# Patient Record
Sex: MALE | Race: WHITE | NOT HISPANIC OR LATINO | Employment: FULL TIME | URBAN - METROPOLITAN AREA
[De-identification: names, ages, dates, MRNs, and addresses within clinical notes are randomized per-mention and may not be internally consistent; named-entity substitution may affect disease eponyms.]

---

## 2019-01-19 PROCEDURE — 99284 EMERGENCY DEPT VISIT MOD MDM: CPT | Mod: 25 | Performed by: EMERGENCY MEDICINE

## 2019-01-19 PROCEDURE — 81001 URINALYSIS AUTO W/SCOPE: CPT | Performed by: EMERGENCY MEDICINE

## 2019-01-19 PROCEDURE — 76705 ECHO EXAM OF ABDOMEN: CPT | Performed by: EMERGENCY MEDICINE

## 2019-01-19 PROCEDURE — 76705 ECHO EXAM OF ABDOMEN: CPT | Mod: 26 | Performed by: EMERGENCY MEDICINE

## 2019-01-20 ENCOUNTER — HOSPITAL ENCOUNTER (EMERGENCY)
Facility: CLINIC | Age: 49
Discharge: HOME | End: 2019-01-20
Attending: EMERGENCY MEDICINE | Admitting: EMERGENCY MEDICINE
Payer: COMMERCIAL

## 2019-01-20 VITALS
DIASTOLIC BLOOD PRESSURE: 95 MMHG | WEIGHT: 230 LBS | OXYGEN SATURATION: 96 % | SYSTOLIC BLOOD PRESSURE: 145 MMHG | RESPIRATION RATE: 16 BRPM | HEART RATE: 64 BPM

## 2019-01-20 DIAGNOSIS — R10.9 FLANK PAIN: ICD-10-CM

## 2019-01-20 DIAGNOSIS — N20.1 URETEROLITHIASIS: ICD-10-CM

## 2019-01-20 LAB
ALBUMIN SERPL-MCNC: 4.3 G/DL (ref 3.4–5)
ALBUMIN UR-MCNC: 30 MG/DL
ALP SERPL-CCNC: 91 U/L (ref 40–150)
ALT SERPL W P-5'-P-CCNC: 36 U/L (ref 0–70)
ANION GAP SERPL CALCULATED.3IONS-SCNC: 10 MMOL/L (ref 3–14)
APPEARANCE UR: ABNORMAL
AST SERPL W P-5'-P-CCNC: 26 U/L (ref 0–45)
BASOPHILS # BLD AUTO: 0.1 10E9/L (ref 0–0.2)
BASOPHILS NFR BLD AUTO: 0.4 %
BILIRUB SERPL-MCNC: 0.4 MG/DL (ref 0.2–1.3)
BILIRUB UR QL STRIP: NEGATIVE
BUN SERPL-MCNC: 15 MG/DL (ref 7–30)
CALCIUM SERPL-MCNC: 8.8 MG/DL (ref 8.5–10.1)
CHLORIDE SERPL-SCNC: 106 MMOL/L (ref 94–109)
CO2 SERPL-SCNC: 25 MMOL/L (ref 20–32)
COLOR UR AUTO: YELLOW
CREAT SERPL-MCNC: 1.03 MG/DL (ref 0.66–1.25)
DIFFERENTIAL METHOD BLD: ABNORMAL
EOSINOPHIL # BLD AUTO: 0.1 10E9/L (ref 0–0.7)
EOSINOPHIL NFR BLD AUTO: 0.4 %
ERYTHROCYTE [DISTWIDTH] IN BLOOD BY AUTOMATED COUNT: 12.2 % (ref 10–15)
GFR SERPL CREATININE-BSD FRML MDRD: 86 ML/MIN/{1.73_M2}
GLUCOSE SERPL-MCNC: 144 MG/DL (ref 70–99)
GLUCOSE UR STRIP-MCNC: NEGATIVE MG/DL
HCT VFR BLD AUTO: 43.4 % (ref 40–53)
HGB BLD-MCNC: 15.5 G/DL (ref 13.3–17.7)
HGB UR QL STRIP: ABNORMAL
IMM GRANULOCYTES # BLD: 0.1 10E9/L (ref 0–0.4)
IMM GRANULOCYTES NFR BLD: 0.5 %
KETONES UR STRIP-MCNC: 5 MG/DL
LEUKOCYTE ESTERASE UR QL STRIP: NEGATIVE
LYMPHOCYTES # BLD AUTO: 1 10E9/L (ref 0.8–5.3)
LYMPHOCYTES NFR BLD AUTO: 7.2 %
MCH RBC QN AUTO: 30.8 PG (ref 26.5–33)
MCHC RBC AUTO-ENTMCNC: 35.7 G/DL (ref 31.5–36.5)
MCV RBC AUTO: 86 FL (ref 78–100)
MONOCYTES # BLD AUTO: 0.4 10E9/L (ref 0–1.3)
MONOCYTES NFR BLD AUTO: 2.9 %
MUCOUS THREADS #/AREA URNS LPF: PRESENT /LPF
NEUTROPHILS # BLD AUTO: 12.3 10E9/L (ref 1.6–8.3)
NEUTROPHILS NFR BLD AUTO: 88.6 %
NITRATE UR QL: NEGATIVE
NRBC # BLD AUTO: 0 10*3/UL
NRBC BLD AUTO-RTO: 0 /100
PH UR STRIP: 6 PH (ref 5–7)
PLATELET # BLD AUTO: 252 10E9/L (ref 150–450)
POTASSIUM SERPL-SCNC: 3.6 MMOL/L (ref 3.4–5.3)
PROT SERPL-MCNC: 7.8 G/DL (ref 6.8–8.8)
RBC # BLD AUTO: 5.04 10E12/L (ref 4.4–5.9)
RBC #/AREA URNS AUTO: 73 /HPF (ref 0–2)
SODIUM SERPL-SCNC: 141 MMOL/L (ref 133–144)
SOURCE: ABNORMAL
SP GR UR STRIP: 1.03 (ref 1–1.03)
SQUAMOUS #/AREA URNS AUTO: <1 /HPF (ref 0–1)
UROBILINOGEN UR STRIP-MCNC: 2 MG/DL (ref 0–2)
WBC # BLD AUTO: 13.9 10E9/L (ref 4–11)
WBC #/AREA URNS AUTO: 2 /HPF (ref 0–5)

## 2019-01-20 PROCEDURE — 80053 COMPREHEN METABOLIC PANEL: CPT | Performed by: EMERGENCY MEDICINE

## 2019-01-20 PROCEDURE — 96361 HYDRATE IV INFUSION ADD-ON: CPT | Performed by: EMERGENCY MEDICINE

## 2019-01-20 PROCEDURE — 85025 COMPLETE CBC W/AUTO DIFF WBC: CPT | Performed by: EMERGENCY MEDICINE

## 2019-01-20 PROCEDURE — 25000128 H RX IP 250 OP 636: Performed by: EMERGENCY MEDICINE

## 2019-01-20 PROCEDURE — 96374 THER/PROPH/DIAG INJ IV PUSH: CPT | Performed by: EMERGENCY MEDICINE

## 2019-01-20 RX ORDER — IBUPROFEN 200 MG
600-800 TABLET ORAL EVERY 8 HOURS PRN
Qty: 60 TABLET | Refills: 0 | COMMUNITY
Start: 2019-01-20 | End: 2019-01-25

## 2019-01-20 RX ORDER — ONDANSETRON 4 MG/1
4 TABLET, ORALLY DISINTEGRATING ORAL EVERY 8 HOURS PRN
Status: DISCONTINUED | OUTPATIENT
Start: 2019-01-20 | End: 2019-01-20 | Stop reason: HOSPADM

## 2019-01-20 RX ORDER — ACETAMINOPHEN 500 MG
1000 TABLET ORAL EVERY 8 HOURS PRN
Qty: 30 TABLET | Refills: 0 | COMMUNITY
Start: 2019-01-20 | End: 2019-01-25

## 2019-01-20 RX ORDER — SODIUM CHLORIDE 9 MG/ML
1000 INJECTION, SOLUTION INTRAVENOUS CONTINUOUS
Status: DISCONTINUED | OUTPATIENT
Start: 2019-01-20 | End: 2019-01-20 | Stop reason: HOSPADM

## 2019-01-20 RX ORDER — KETOROLAC TROMETHAMINE 30 MG/ML
30 INJECTION, SOLUTION INTRAMUSCULAR; INTRAVENOUS ONCE
Status: COMPLETED | OUTPATIENT
Start: 2019-01-20 | End: 2019-01-20

## 2019-01-20 RX ORDER — ONDANSETRON 4 MG/1
4 TABLET, ORALLY DISINTEGRATING ORAL EVERY 8 HOURS PRN
Qty: 10 TABLET | Refills: 0 | Status: SHIPPED | OUTPATIENT
Start: 2019-01-20 | End: 2019-01-23

## 2019-01-20 RX ADMIN — KETOROLAC TROMETHAMINE 30 MG: 30 INJECTION, SOLUTION INTRAMUSCULAR at 00:51

## 2019-01-20 RX ADMIN — SODIUM CHLORIDE 1000 ML: 9 INJECTION, SOLUTION INTRAVENOUS at 00:51

## 2019-01-20 ASSESSMENT — ENCOUNTER SYMPTOMS
DYSURIA: 0
NAUSEA: 1
WEAKNESS: 0
CHEST TIGHTNESS: 0
SHORTNESS OF BREATH: 0
BACK PAIN: 1
FLANK PAIN: 1
APPETITE CHANGE: 0
VOMITING: 0
ABDOMINAL PAIN: 1
FEVER: 0
NUMBNESS: 0

## 2019-01-20 NOTE — ED AVS SNAPSHOT
[facility] Emergency Department  [address] [address]  Phone:  [phone]  Fax:  [phone]                                    Darnell Linder   MRN: [id number]    Department:  [facility] Emergency Department   Date of Visit:  1/19/2019           After Visit Summary Signature Page    I have received my discharge instructions, and my questions have been answered. I have discussed any challenges I see with this plan with the nurse or doctor.    ..........................................................................................................................................  Patient/Patient Representative Signature      ..........................................................................................................................................  Patient Representative Print Name and Relationship to Patient    ..................................................               ................................................  Date                                   Time    ..........................................................................................................................................  Reviewed by Signature/Title    ...................................................              ..............................................  Date                                               Time          22EPIC Rev 08/18

## 2019-01-20 NOTE — ED PROVIDER NOTES
History     Chief Complaint   Patient presents with     Flank Pain     bilateral, pain radiates to lower abd, pain with urination, hx of kidney stones     HPI  Darnell Linder is a 48 year old male with a history of kidney stones in the past presenting for evaluation of bilateral flank pain now radiating down to his suprapubic area.  Symptoms began abruptly around 6 PM last night.  Symptoms rapidly progressed from flank pain to suprapubic pain and pressure.  Denies dysuria, urgency, or frequency.  Symptoms reported to be similar to previous kidney stones he experienced however symptoms have progressed much more quickly.  He does report his last kidney stone passed spontaneously without intervention.  Denies any fever or chills.  Denies any testicular pain.  Reports some associated nausea but no vomiting.  No medications taken prior to ED visit.    Allergies:  Not on File    Problem List:    There are no active problems to display for this patient.       Past Medical History:    No past medical history on file.    Past Surgical History:    No past surgical history on file.    Family History:    No family history on file.    Social History:  Marital Status:   [2]  Social History     Tobacco Use     Smoking status: Not on file   Substance Use Topics     Alcohol use: Not on file     Drug use: Not on file        Medications:      acetaminophen (TYLENOL) 500 MG tablet   ibuprofen (ADVIL/MOTRIN) 200 MG tablet   ondansetron (ZOFRAN ODT) 4 MG ODT tab         Review of Systems   Constitutional: Negative for appetite change and fever.   HENT: Negative for congestion.    Respiratory: Negative for chest tightness and shortness of breath.    Cardiovascular: Negative for chest pain.   Gastrointestinal: Positive for abdominal pain and nausea. Negative for vomiting.   Genitourinary: Positive for flank pain. Negative for decreased urine volume, dysuria and urgency.   Musculoskeletal: Positive for back pain.   Skin: Negative for  rash.   Neurological: Negative for weakness and numbness.   All other systems reviewed and are negative.      Physical Exam   BP: (!) 148/94  Pulse: 56  Heart Rate: 75  Resp: 18  Weight: 104.3 kg (230 lb)  SpO2: 97 %      Physical Exam   Constitutional: He is oriented to person, place, and time. He appears well-developed and well-nourished. No distress.   HENT:   Head: Normocephalic and atraumatic.   Eyes: Conjunctivae are normal.   Cardiovascular: Normal rate and regular rhythm.   Pulmonary/Chest: Effort normal. No respiratory distress.   Abdominal: Soft. There is tenderness (mild suprapubic). There is no rebound, no guarding and no CVA tenderness.   Neurological: He is alert and oriented to person, place, and time.   Skin: Skin is warm and dry. Capillary refill takes less than 2 seconds.   Psychiatric: He has a normal mood and affect.   Nursing note and vitals reviewed.      ED Course        Procedures        Results for orders placed or performed during the hospital encounter of 01/20/19 (from the past 24 hour(s))   UA with Microscopic   Result Value Ref Range    Color Urine Yellow     Appearance Urine Slightly Cloudy     Glucose Urine Negative NEG^Negative mg/dL    Bilirubin Urine Negative NEG^Negative    Ketones Urine 5 (A) NEG^Negative mg/dL    Specific Gravity Urine 1.032 1.003 - 1.035    Blood Urine Moderate (A) NEG^Negative    pH Urine 6.0 5.0 - 7.0 pH    Protein Albumin Urine 30 (A) NEG^Negative mg/dL    Urobilinogen mg/dL 2.0 0.0 - 2.0 mg/dL    Nitrite Urine Negative NEG^Negative    Leukocyte Esterase Urine Negative NEG^Negative    Source Midstream Urine     WBC Urine 2 0 - 5 /HPF    RBC Urine 73 (H) 0 - 2 /HPF    Squamous Epithelial /HPF Urine <1 0 - 1 /HPF    Mucous Urine Present (A) NEG^Negative /LPF   CBC with platelets differential   Result Value Ref Range    WBC 13.9 (H) 4.0 - 11.0 10e9/L    RBC Count 5.04 4.4 - 5.9 10e12/L    Hemoglobin 15.5 13.3 - 17.7 g/dL    Hematocrit 43.4 40.0 - 53.0 %    MCV  86 78 - 100 fl    MCH 30.8 26.5 - 33.0 pg    MCHC 35.7 31.5 - 36.5 g/dL    RDW 12.2 10.0 - 15.0 %    Platelet Count 252 150 - 450 10e9/L    Diff Method Automated Method     % Neutrophils 88.6 %    % Lymphocytes 7.2 %    % Monocytes 2.9 %    % Eosinophils 0.4 %    % Basophils 0.4 %    % Immature Granulocytes 0.5 %    Nucleated RBCs 0 0 /100    Absolute Neutrophil 12.3 (H) 1.6 - 8.3 10e9/L    Absolute Lymphocytes 1.0 0.8 - 5.3 10e9/L    Absolute Monocytes 0.4 0.0 - 1.3 10e9/L    Absolute Eosinophils 0.1 0.0 - 0.7 10e9/L    Absolute Basophils 0.1 0.0 - 0.2 10e9/L    Abs Immature Granulocytes 0.1 0 - 0.4 10e9/L    Absolute Nucleated RBC 0.0    Comprehensive metabolic panel   Result Value Ref Range    Sodium 141 133 - 144 mmol/L    Potassium 3.6 3.4 - 5.3 mmol/L    Chloride 106 94 - 109 mmol/L    Carbon Dioxide 25 20 - 32 mmol/L    Anion Gap 10 3 - 14 mmol/L    Glucose 144 (H) 70 - 99 mg/dL    Urea Nitrogen 15 7 - 30 mg/dL    Creatinine 1.03 0.66 - 1.25 mg/dL    GFR Estimate 86 >60 mL/min/[1.73_m2]    GFR Estimate If Black >90 >60 mL/min/[1.73_m2]    Calcium 8.8 8.5 - 10.1 mg/dL    Bilirubin Total 0.4 0.2 - 1.3 mg/dL    Albumin 4.3 3.4 - 5.0 g/dL    Protein Total 7.8 6.8 - 8.8 g/dL    Alkaline Phosphatase 91 40 - 150 U/L    ALT 36 0 - 70 U/L    AST 26 0 - 45 U/L   POC US ABDOMEN LIMITED    [facility] Procedure Note    Limited Bedside ED Renal Ultrasound:    PERFORMED BY: Dr. Scott Evans Espinoza  INDICATIONS:  Abdominal Pain/flank  PROBE: Low frequency convex probe  BODY LOCATION:  Abdomen  FINDINGS:  The ultrasound was performed with longitudinal and transverse views.   Right Kidney:   Hydronephrosis:  None   }  Left Kidney:   Hydronephrosis:  None     INTERPRETATION:  The evaluation of the kidneys was normal without evidence of hydronephrosis  IMAGE DOCUMENTATION: Images were archived to PACs system.           Medications   0.9% sodium chloride BOLUS (0 mLs Intravenous Stopped 1/20/19 8540)    ketorolac (TORADOL) injection 30 mg (30 mg Intravenous Given 1/20/19 0051)     2:00 AM: Patient reassessed.  Pain remains controlled.  Plan for discharge home and urology follow-up if symptoms not improving.      Assessments & Plan (with Medical Decision Making)  48-year-old male with previous history of kidney stones presenting for evaluation of pain rating to his lower abdomen and now primarily with suprapubic abdominal pain.  Symptoms similar to previous kidney stones.  Previous single kidney stone passed without intervention.  Bedside ultrasound did not show any obvious hydronephrosis but his urine sample did show blood.  Given he has previously been confirmed to have a diagnosis of kidney stones and his symptoms are similar to previous stones that he has hematuria on his urinalysis, symptoms most consistent with ureterolithiasis.  Symptomatically improved with Toradol.  Discussed that a CT could be performed for definitive diagnosis however is clinically consistent with ureterolithiasis and his symptoms are now adequately controlled.  Patient and myself are comfortable with symptomatic treatment with presumed expected management of ureterolithiasis.  Recommended follow-up with urology in 1 week if symptoms not resolved.  Symptoms dramatically worsen or uncontrolled with medications at home, return to the ED for repeat evaluation.  Encourage treatment with 800 mg of ibuprofen alternating with 1000 mg of acetaminophen every 4 hours as needed for pain control.  Discussed opiates for pain control as well however, patient has had severe nausea and vomiting with opiates in the past so patient prefers to avoid these.  Also discussed treatment with Flomax.  Given his previous stones have passed and evidence is markedly inconclusive regarding the benefit of Flomax, especially for small stones, recommended against its use at this time.     I have reviewed the nursing notes.    I have reviewed the findings, diagnosis,  plan and need for follow up with the patient.          Medication List      Started    acetaminophen 500 MG tablet  Commonly known as:  TYLENOL  1,000 mg, Oral, EVERY 8 HOURS PRN     ibuprofen 200 MG tablet  Commonly known as:  ADVIL/MOTRIN  600-800 mg, Oral, EVERY 8 HOURS PRN     ondansetron 4 MG ODT tab  Commonly known as:  ZOFRAN ODT  4 mg, Oral, EVERY 8 HOURS PRN            Final diagnoses:   Flank pain   Ureterolithiasis       1/19/2019   [facility] EMERGENCY DEPARTMENT     Espinoza, Scott Evans MD  01/20/19 6250

## 2019-01-20 NOTE — ED TRIAGE NOTES
"Sudden onset low back/flank pain that progressed to wrap around into groin on both sides approx 2000hrs  Has had urge to urinate with small results since some nausea when pain spikes no vomiting   Had kidney stone about 5yrs ago feels similar  Did not take any OTCs at home    Reports strong pain meds make him sick per wife \"he puked for days last time he had some\"  "

## 2019-07-25 ENCOUNTER — RECORDS - HEALTHEAST (OUTPATIENT)
Dept: LAB | Facility: CLINIC | Age: 49
End: 2019-07-25

## 2019-07-25 LAB
ALBUMIN SERPL-MCNC: 4.4 G/DL (ref 3.5–5)
ALP SERPL-CCNC: 74 U/L (ref 45–120)
ALT SERPL W P-5'-P-CCNC: 28 U/L (ref 0–45)
ANION GAP SERPL CALCULATED.3IONS-SCNC: 10 MMOL/L (ref 5–18)
AST SERPL W P-5'-P-CCNC: 24 U/L (ref 0–40)
BILIRUB SERPL-MCNC: 0.5 MG/DL (ref 0–1)
BUN SERPL-MCNC: 20 MG/DL (ref 8–22)
CALCIUM SERPL-MCNC: 10.3 MG/DL (ref 8.5–10.5)
CHLORIDE BLD-SCNC: 105 MMOL/L (ref 98–107)
CHOLEST SERPL-MCNC: 180 MG/DL
CO2 SERPL-SCNC: 25 MMOL/L (ref 22–31)
CREAT SERPL-MCNC: 0.93 MG/DL (ref 0.7–1.3)
FASTING STATUS PATIENT QL REPORTED: NORMAL
GFR SERPL CREATININE-BSD FRML MDRD: >60 ML/MIN/1.73M2
GLUCOSE BLD-MCNC: 112 MG/DL (ref 70–125)
HDLC SERPL-MCNC: 60 MG/DL
LDLC SERPL CALC-MCNC: 102 MG/DL
POTASSIUM BLD-SCNC: 4.1 MMOL/L (ref 3.5–5)
PROT SERPL-MCNC: 7.2 G/DL (ref 6–8)
SODIUM SERPL-SCNC: 140 MMOL/L (ref 136–145)
TRIGL SERPL-MCNC: 88 MG/DL

## 2019-11-14 ENCOUNTER — ANESTHESIA - HEALTHEAST (OUTPATIENT)
Dept: SURGERY | Facility: CLINIC | Age: 49
End: 2019-11-14

## 2019-11-14 ENCOUNTER — SURGERY - HEALTHEAST (OUTPATIENT)
Dept: SURGERY | Facility: CLINIC | Age: 49
End: 2019-11-14

## 2019-11-14 ASSESSMENT — MIFFLIN-ST. JEOR: SCORE: 1888.23

## 2021-04-25 ENCOUNTER — HEALTH MAINTENANCE LETTER (OUTPATIENT)
Age: 51
End: 2021-04-25

## 2021-04-30 ENCOUNTER — IMMUNIZATION (OUTPATIENT)
Dept: FAMILY MEDICINE | Facility: CLINIC | Age: 51
End: 2021-04-30
Payer: COMMERCIAL

## 2021-04-30 PROCEDURE — 91301 PR COVID VAC MODERNA 100 MCG/0.5 ML IM: CPT

## 2021-04-30 PROCEDURE — 0011A PR COVID VAC MODERNA 100 MCG/0.5 ML IM: CPT

## 2021-05-28 ENCOUNTER — IMMUNIZATION (OUTPATIENT)
Dept: FAMILY MEDICINE | Facility: CLINIC | Age: 51
End: 2021-05-28
Attending: FAMILY MEDICINE
Payer: COMMERCIAL

## 2021-05-28 PROCEDURE — 91301 PR COVID VAC MODERNA 100 MCG/0.5 ML IM: CPT

## 2021-05-28 PROCEDURE — 0012A PR COVID VAC MODERNA 100 MCG/0.5 ML IM: CPT

## 2021-06-03 NOTE — ANESTHESIA CARE TRANSFER NOTE
Last vitals:   Vitals:    11/14/19 1540   BP: 137/84   Pulse: 82   Resp: 18   Temp: 35.9  C (96.6  F)   SpO2: 90%     Patient's level of consciousness is drowsy  Spontaneous respirations: yes  Maintains airway independently: yes  Dentition unchanged: yes  Oropharynx: oropharynx clear of all foreign objects    QCDR Measures:  ASA# 20 - Surgical Safety Checklist: WHO surgical safety checklist completed prior to induction    PQRS# 430 - Adult PONV Prevention: 4558F - Pt received => 2 anti-emetic agents (different classes) preop & intraop  ASA# 8 - Peds PONV Prevention: NA - Not pediatric patient, not GA or 2 or more risk factors NOT present  PQRS# 424 - Brianda-op Temp Management: 4559F - At least one body temp DOCUMENTED => 35.5C or 95.9F within required timeframe  PQRS# 426 - PACU Transfer Protocol: - Transfer of care checklist used  ASA# 14 - Acute Post-op Pain: ASA14B - Patient did NOT experience pain >= 7 out of 10

## 2021-06-03 NOTE — ANESTHESIA POSTPROCEDURE EVALUATION
Patient: Darnell CROSS Long  NASAL VALVE RECONSTRUCTION, BILATERAL, SUBMUCOUS RESECTION OF INFERIOR TURBINATES, SEPTOPLASTY, BIOPSY LEFT MIDDLE TURBINATE  Anesthesia type: general    Patient location: PACU  Last vitals:   Vitals Value Taken Time   /81 11/14/2019  5:00 PM   Temp 36.2  C (97.2  F) 11/14/2019  4:00 PM   Pulse 79 11/14/2019  5:09 PM   Resp 20 11/14/2019  4:30 PM   SpO2 96 % 11/14/2019  5:09 PM   Vitals shown include unvalidated device data.  Post vital signs: stable  Level of consciousness: awake and responds to simple questions  Post-anesthesia pain: pain controlled  Post-anesthesia nausea and vomiting: no  Pulmonary: unassisted, return to baseline  Cardiovascular: stable and blood pressure at baseline  Hydration: adequate  Anesthetic events: no    QCDR Measures:  ASA# 11 - Brianda-op Cardiac Arrest: ASA11B - Patient did NOT experience unanticipated cardiac arrest  ASA# 12 - Brianda-op Mortality Rate: ASA12B - Patient did NOT die  ASA# 13 - PACU Re-Intubation Rate: ASA13B - Patient did NOT require a new airway mgmt  ASA# 10 - Composite Anes Safety: ASA10A - No serious adverse event    Additional Notes:

## 2021-06-04 VITALS — BODY MASS INDEX: 33.56 KG/M2 | WEIGHT: 226.6 LBS | HEIGHT: 69 IN

## 2021-10-10 ENCOUNTER — HEALTH MAINTENANCE LETTER (OUTPATIENT)
Age: 51
End: 2021-10-10

## 2022-05-21 ENCOUNTER — HEALTH MAINTENANCE LETTER (OUTPATIENT)
Age: 52
End: 2022-05-21

## 2022-06-28 ENCOUNTER — LAB REQUISITION (OUTPATIENT)
Dept: LAB | Facility: CLINIC | Age: 52
End: 2022-06-28

## 2022-06-28 DIAGNOSIS — Z13.220 ENCOUNTER FOR SCREENING FOR LIPOID DISORDERS: ICD-10-CM

## 2022-06-28 DIAGNOSIS — Z13.1 ENCOUNTER FOR SCREENING FOR DIABETES MELLITUS: ICD-10-CM

## 2022-06-28 LAB
ANION GAP SERPL CALCULATED.3IONS-SCNC: 10 MMOL/L (ref 5–18)
BUN SERPL-MCNC: 17 MG/DL (ref 8–22)
CALCIUM SERPL-MCNC: 9.9 MG/DL (ref 8.5–10.5)
CHLORIDE BLD-SCNC: 105 MMOL/L (ref 98–107)
CHOLEST SERPL-MCNC: 191 MG/DL
CO2 SERPL-SCNC: 25 MMOL/L (ref 22–31)
CREAT SERPL-MCNC: 0.83 MG/DL (ref 0.7–1.3)
GFR SERPL CREATININE-BSD FRML MDRD: >90 ML/MIN/1.73M2
GLUCOSE BLD-MCNC: 86 MG/DL (ref 70–125)
HDLC SERPL-MCNC: 64 MG/DL
LDLC SERPL CALC-MCNC: 100 MG/DL
POTASSIUM BLD-SCNC: 4.1 MMOL/L (ref 3.5–5)
SODIUM SERPL-SCNC: 140 MMOL/L (ref 136–145)
TRIGL SERPL-MCNC: 135 MG/DL

## 2022-06-28 PROCEDURE — 80061 LIPID PANEL: CPT | Performed by: FAMILY MEDICINE

## 2022-06-28 PROCEDURE — 80048 BASIC METABOLIC PNL TOTAL CA: CPT | Performed by: FAMILY MEDICINE

## 2022-09-18 ENCOUNTER — HEALTH MAINTENANCE LETTER (OUTPATIENT)
Age: 52
End: 2022-09-18

## 2023-06-04 ENCOUNTER — HEALTH MAINTENANCE LETTER (OUTPATIENT)
Age: 53
End: 2023-06-04

## 2023-06-30 ENCOUNTER — HOSPITAL ENCOUNTER (OUTPATIENT)
Facility: CLINIC | Age: 53
Discharge: HOME | End: 2023-06-30
Admitting: FAMILY MEDICINE
Payer: COMMERCIAL

## 2023-06-30 ENCOUNTER — LAB REQUISITION (OUTPATIENT)
Dept: LAB | Facility: CLINIC | Age: 53
End: 2023-06-30
Payer: COMMERCIAL

## 2023-06-30 DIAGNOSIS — Z13.220 ENCOUNTER FOR SCREENING FOR LIPOID DISORDERS: ICD-10-CM

## 2023-06-30 DIAGNOSIS — R10.32 LEFT LOWER QUADRANT PAIN: ICD-10-CM

## 2023-06-30 LAB
ALBUMIN SERPL BCG-MCNC: 4.7 G/DL (ref 3.5–5.2)
ALP SERPL-CCNC: 93 U/L (ref 40–129)
ALT SERPL W P-5'-P-CCNC: 29 U/L (ref 0–70)
ANION GAP SERPL CALCULATED.3IONS-SCNC: 11 MMOL/L (ref 7–15)
AST SERPL W P-5'-P-CCNC: 22 U/L (ref 0–45)
BILIRUB SERPL-MCNC: 0.6 MG/DL
BUN SERPL-MCNC: 14.8 MG/DL (ref 6–20)
CALCIUM SERPL-MCNC: 10.1 MG/DL (ref 8.6–10)
CHLORIDE SERPL-SCNC: 102 MMOL/L (ref 98–107)
CHOLEST SERPL-MCNC: 188 MG/DL
CREAT SERPL-MCNC: 0.92 MG/DL (ref 0.67–1.17)
CRP SERPL-MCNC: 7.23 MG/L
DEPRECATED HCO3 PLAS-SCNC: 25 MMOL/L (ref 22–29)
GFR SERPL CREATININE-BSD FRML MDRD: >90 ML/MIN/1.73M2
GLUCOSE SERPL-MCNC: 122 MG/DL (ref 70–99)
HDLC SERPL-MCNC: 58 MG/DL
LDLC SERPL CALC-MCNC: 111 MG/DL
NONHDLC SERPL-MCNC: 130 MG/DL
POTASSIUM SERPL-SCNC: 4.2 MMOL/L (ref 3.4–5.3)
PROT SERPL-MCNC: 7.5 G/DL (ref 6.4–8.3)
SODIUM SERPL-SCNC: 138 MMOL/L (ref 136–145)
TRIGL SERPL-MCNC: 93 MG/DL

## 2023-06-30 PROCEDURE — 80053 COMPREHEN METABOLIC PANEL: CPT | Mod: ORL | Performed by: FAMILY MEDICINE

## 2023-06-30 PROCEDURE — 86140 C-REACTIVE PROTEIN: CPT | Mod: ORL | Performed by: FAMILY MEDICINE

## 2023-06-30 PROCEDURE — 80061 LIPID PANEL: CPT | Mod: ORL | Performed by: FAMILY MEDICINE

## 2024-07-14 ENCOUNTER — HEALTH MAINTENANCE LETTER (OUTPATIENT)
Age: 54
End: 2024-07-14

## 2025-07-19 ENCOUNTER — HEALTH MAINTENANCE LETTER (OUTPATIENT)
Age: 55
End: 2025-07-19